# Patient Record
Sex: FEMALE | Race: OTHER | HISPANIC OR LATINO | URBAN - METROPOLITAN AREA
[De-identification: names, ages, dates, MRNs, and addresses within clinical notes are randomized per-mention and may not be internally consistent; named-entity substitution may affect disease eponyms.]

---

## 2018-01-07 ENCOUNTER — EMERGENCY (EMERGENCY)
Facility: HOSPITAL | Age: 48
LOS: 1 days | End: 2018-01-07
Attending: EMERGENCY MEDICINE | Admitting: EMERGENCY MEDICINE
Payer: SELF-PAY

## 2018-01-07 VITALS
SYSTOLIC BLOOD PRESSURE: 136 MMHG | TEMPERATURE: 98 F | DIASTOLIC BLOOD PRESSURE: 88 MMHG | HEART RATE: 80 BPM | OXYGEN SATURATION: 99 % | RESPIRATION RATE: 18 BRPM | HEIGHT: 60 IN | WEIGHT: 139.99 LBS

## 2018-01-07 PROCEDURE — 99282 EMERGENCY DEPT VISIT SF MDM: CPT

## 2018-01-07 PROCEDURE — 99283 EMERGENCY DEPT VISIT LOW MDM: CPT

## 2018-01-07 NOTE — ED ADULT NURSE NOTE - CHPI ED SYMPTOMS NEG
no diarrhea/no discoloration/no disorientation/no difficulty breathing/no decreased eating/drinking/no chest tightness/no dark urine/no fever/no abdominal distension

## 2018-01-07 NOTE — ED PROVIDER NOTE - OBJECTIVE STATEMENT
46 YO FEMALE PRESENTS WITH CO EXPOSURE. PART OF [employer] THAT WAS EXPOSED.  HAD HA AND VAGUE CP AT THE [employer] BUT NOT AFTER LEAVING THE [employer]. PT ASYMPTOMATIC AT THIS TIME. NO VOMITING NO LOC  NO SIG MED HX

## 2018-01-07 NOTE — ED ADULT NURSE NOTE - OBJECTIVE STATEMENT
pt alert and awake x3, arrived to ED with CO exposure from 1930 tonight, pt states she went home without symptoms, denies any symptoms, pt denies chest pain/sob, denies HA/blurred vision, LS clear, denies n/v/d, will continue to monitor.

## 2018-01-08 VITALS
DIASTOLIC BLOOD PRESSURE: 80 MMHG | TEMPERATURE: 98 F | HEART RATE: 80 BPM | RESPIRATION RATE: 18 BRPM | SYSTOLIC BLOOD PRESSURE: 125 MMHG | OXYGEN SATURATION: 99 %

## 2021-01-17 ENCOUNTER — EMERGENCY (EMERGENCY)
Facility: HOSPITAL | Age: 51
LOS: 1 days | Discharge: HOME | End: 2021-01-17
Payer: MEDICARE

## 2021-01-17 VITALS
OXYGEN SATURATION: 99 % | RESPIRATION RATE: 18 BRPM | DIASTOLIC BLOOD PRESSURE: 85 MMHG | HEIGHT: 60 IN | WEIGHT: 130.07 LBS | SYSTOLIC BLOOD PRESSURE: 125 MMHG | TEMPERATURE: 98 F | HEART RATE: 79 BPM

## 2021-01-17 LAB — SARS-COV-2 RNA SPEC QL NAA+PROBE: SIGNIFICANT CHANGE UP

## 2021-01-17 PROCEDURE — U0005: CPT

## 2021-01-17 PROCEDURE — U0003: CPT

## 2021-01-17 PROCEDURE — 99284 EMERGENCY DEPT VISIT MOD MDM: CPT

## 2021-01-17 PROCEDURE — 99283 EMERGENCY DEPT VISIT LOW MDM: CPT

## 2021-01-17 NOTE — ED PROVIDER NOTE - OBJECTIVE STATEMENT
51 y/o F presents wicho c/o 2 days chills and HA, no other symproms, Requesting COVID swab, no sick contacts.

## 2021-01-17 NOTE — ED PROVIDER NOTE - PHYSICAL EXAMINATION
Constitional: awake and alert  Eyes: clear bilaterally  Cardiac: regular rate,  S1S2, no murmur  Respiratory: CTA/BL, even and unlabored  Abdomin: soft, NT to palpation  Skin: warm and dry

## 2021-01-17 NOTE — ED PROVIDER NOTE - NS ED ROS FT
General: no fever or chills  Eyes: no redness or discharge  ENT: no nasal congestion, no sore throat  Cardiac: no CP, no palpitations  Respiratory: No cough, SOB, JOHNSON, wheeze  Abd: no abd pain, N/V/D  Skin: no itch or rash

## 2021-01-17 NOTE — ED PROVIDER NOTE - PATIENT PORTAL LINK FT
You can access the FollowMyHealth Patient Portal offered by [facility] by registering at the following website: [email or web address]. By joining [facility]’s FollowMyHealth portal, you will also be able to view your health information using other applications (apps) compatible with our system.

## 2023-03-19 NOTE — ED ADULT TRIAGE NOTE - RESPIRATORY RATE (BREATHS/MIN)
[de-identified] : There are multiple splinters in the palms and fingers of both hands.  They are generally very superficial. 18
